# Patient Record
(demographics unavailable — no encounter records)

---

## 2024-12-09 NOTE — HISTORY OF PRESENT ILLNESS
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 He continues to smoke around 1ppd. He has been having laryngitis for about a month. He tried coming off the Trelegy for 3 days to see if that was contributing, but did not change. He feels the Trelegy is helpful. He wants to quit smoking but is having a difficult time. No recent illness or hospitalizations.  [TextBox_11] : 1 [TextBox_13] : 40

## 2024-12-09 NOTE — ASSESSMENT
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   - C/w Trelegy 100 - refills sent - He picked up Nicotrol nasal spray but never started - advised him to start - He is having trouble quitting but knows he needs to and is motivated - Repeat CT ordered for 06/2025 - F/u in 3 mo time routine visit  The patient expressed understanding and agreement with the plan as outlined above and accepts responsibility to be compliant with any recommended testing, treatment, and follow-up visits.  All relevant questions and concerns were addressed.  35 minutes of time were spent on the encounter. Medical records were reviewed, including but not limited to hospital records, outpatient records, laboratory data, and diagnostic imaging studies. Greater than 50% of the face-to-face encounter time was spent on counseling and/or coordination of care.   Abhinav Molina MD, [facility] Pulmonary & Critical Care Medicine [facility] at [place] [address] [place], [place] [address] T: [phone] F: [phone]
Detail Level: Generalized
Detail Level: Detailed

## 2024-12-09 NOTE — COUNSELING
[Cessation strategies including cessation program discussed] : Cessation strategies including cessation program discussed [Encouraged to pick a quit date and identify support needed to quit] : Encouraged to pick a quit date and identify support needed to quit [Yes] : Willing to quit smoking [FreeTextEntry3] : 12

## 2025-05-09 NOTE — HISTORY OF PRESENT ILLNESS
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 He is on Trelegy 100. CT 03/2025 without concerning nodularities. He is smoking around 0.75PPD. No fevers, no chills. Reports sinus issues, post nasal drip, and copious mucus in the morning with coughing.  [TextBox_11] : 1 [TextBox_13] : 40

## 2025-05-09 NOTE — ASSESSMENT
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   - He is on Trelegy 100 - refills sent - Refills of albuterol sent as well - CT 03/2025 without concerning nodularities.  - Will repeat in 6 mo - 09/2025, ordered - Advised to f/u with Dr. Restrepo oncology - F/u with ENT re: his PND - I advised he can try doing nasal sinus rinse such as Neilmed - Ongoing smoking cessation counseling - Advised to start Nicotrol - F/u in 4 mo time  The patient expressed understanding and agreement with the plan as outlined above and accepts responsibility to be compliant with any recommended testing, treatment, and follow-up visits.  All relevant questions and concerns were addressed.  30 minutes of time were spent on the encounter. Medical records were reviewed, including but not limited to hospital records, outpatient records, laboratory data, and diagnostic imaging studies. Greater than 50% of the face-to-face encounter time was spent on counseling and/or coordination of care.   Abhinav Molina MD, [facility] Pulmonary & Critical Care Medicine [facility] at [place] [address] [place], [place] [address] T: [phone] F: [phone]

## 2025-06-20 NOTE — HISTORY OF PRESENT ILLNESS
[FreeTextEntry1] : Mr. MARSH is a 59 year old male referred initially by Dr. Restrepo who presents for a follow up appointment regarding lung nodules.  His past medical history includes current smoker, bladder cancer (high grade urothelial carcinoma, no lymph invasion) s/p excision and subsequent partial cystectomy. He underwent adjuvant chemotherapy x 4 cycles 9/15 - 11/26/21.  He is status post Flexible bronchoscopy, with left robot-assisted lower lobectomy with mediastinal lymph node dissection on 09/12/22 with adenocarcinoma Stage I. He was last seen in our office in 2023 and has been following up with Pulmonology (Dr Molina) since. Most recently he was evaluated with Dr Kaye SHORT with recent PET with concern for metastasis given his two primaries EBUS and sacral biopsy was recommended.   06/18/25: PET Imaging from [facility] ([facility]) - Enlarging FDG avid left prevascular lymph nodes with vocal cord paralysis - Intensely avid subtle lytic lesion in the left hemisacrum consistent with malignancy - New mildly increased FDG uptake in the sigmoid colon non-specific in the background of heavy colonic diverticulosis and chronic appearing wall hypertrophy. - Nondisplaced fractures of the left posterior 5-7 ribs with associated low-grade FDG    03/21/25: CT of the chest non contrast at [place]  - Mediastinal soft tissue changes without discrete nodule in the retrosternal fat which may represent thymic rebound. There is an enlarged prevascular lymph node measuring 1.8 x 1.4 cm.

## 2025-06-20 NOTE — HISTORY OF PRESENT ILLNESS
[FreeTextEntry1] : Mr. MARSH is a 59 year old male referred initially by Dr. Restrepo who presents for a follow up appointment regarding lung nodules.  His past medical history includes current smoker, bladder cancer (high grade urothelial carcinoma, no lymph invasion) s/p excision and subsequent partial cystectomy. He underwent adjuvant chemotherapy x 4 cycles 9/15 - 11/26/21.  He is status post Flexible bronchoscopy, with left robot-assisted lower lobectomy with mediastinal lymph node dissection on 09/12/22 with adenocarcinoma Stage I. He was last seen in our office in 2023 and has been following up with Pulmonology (Dr Molina) since. Most recently he was evaluated with Dr Kaye SHORT with recent PET with concern for metastasis given his two primaries EBUS and sacral biopsy was recommended.   06/18/25: PET Imaging from [facility] ([facility]) - Enlarging FDG avid left prevascular lymph nodes with vocal cord paralysis - Intensely avid subtle lytic lesion in the left hemisacrum consistent with malignancy - New mildly increased FDG uptake in the sigmoid colon non-specific in the background of heavy colonic diverticulosis and chronic appearing wall hypertrophy. - Nondisplaced fractures of the left posterior 5-7 ribs with associated low-grade FDG    03/21/25: CT of the chest non contrast at [place]  - Mediastinal soft tissue changes without discrete nodule in the retrosternal fat which may represent thymic rebound. There is an enlarged prevascular lymph node measuring 1.8 x 1.4 cm.

## 2025-06-20 NOTE — ASSESSMENT
[FreeTextEntry1] : Mr. MARSH is a 59 year old male referred initially by Dr. Restrepo who presents for a follow up appointment regarding lung nodules.  His past medical history includes current smoker, bladder cancer (high grade urothelial carcinoma, no lymph invasion) s/p excision and subsequent partial cystectomy. He underwent adjuvant chemotherapy x 4 cycles 9/15 - 11/26/21.  He is status post Flexible bronchoscopy, with left robot-assisted lower lobectomy with mediastinal lymph node dissection on 09/12/22 with adenocarcinoma Stage I. He was last seen in our office in 2023 and has been following up with Pulmonology (Dr Molina) since. Most recently he was evaluated with Dr Kaye SHORT with recent PET with concern for metastasis given his two primaries EBUS and sacral biopsy was recommended.   06/18/25: PET Imaging from [facility]) - Enlarging FDG avid left prevascular lymph nodes with vocal cord paralysis - Intensely avid subtle lytic lesion in the left hemisacrum consistent with malignancy - New mildly increased FDG uptake in the sigmoid colon non-specific in the background of heavy colonic diverticulosis and chronic appearing wall hypertrophy. - Nondisplaced fractures of the left posterior 5-7 ribs with associated low-grade FDG    03/21/25: CT of the chest non contrast at [place]  - Mediastinal soft tissue changes without discrete nodule in the retrosternal fat which may represent thymic rebound. There is an enlarged prevascular lymph node measuring 1.8 x 1.4 cm.   I have reviewed the patient's medical records and diagnostic images at time of this office consultation and have made the following recommendation: 1.Flex Bronch/EBUS at [facility] 7/2   All questions answered. Patient verbalized understanding and will follow up accordingly.

## 2025-06-20 NOTE — ASSESSMENT
[FreeTextEntry1] : Mr. MARSH is a 59 year old male referred initially by Dr. Restrepo who presents for a follow up appointment regarding lung nodules.  His past medical history includes current smoker, bladder cancer (high grade urothelial carcinoma, no lymph invasion) s/p excision and subsequent partial cystectomy. He underwent adjuvant chemotherapy x 4 cycles 9/15 - 11/26/21.  He is status post Flexible bronchoscopy, with left robot-assisted lower lobectomy with mediastinal lymph node dissection on 09/12/22 with adenocarcinoma Stage I. He was last seen in our office in 2023 and has been following up with Pulmonology (Dr Molina) since. Most recently he was evaluated with Dr Kaye SHORT with recent PET with concern for metastasis given his two primaries EBUS and sacral biopsy was recommended.   06/18/25: PET Imaging from [facility] ([facility]) - Enlarging FDG avid left prevascular lymph nodes with vocal cord paralysis - Intensely avid subtle lytic lesion in the left hemisacrum consistent with malignancy - New mildly increased FDG uptake in the sigmoid colon non-specific in the background of heavy colonic diverticulosis and chronic appearing wall hypertrophy. - Nondisplaced fractures of the left posterior 5-7 ribs with associated low-grade FDG    03/21/25: CT of the chest non contrast at [place]  - Mediastinal soft tissue changes without discrete nodule in the retrosternal fat which may represent thymic rebound. There is an enlarged prevascular lymph node measuring 1.8 x 1.4 cm.   I have reviewed the patient's medical records and diagnostic images at time of this office consultation and have made the following recommendation: 1.Flex Bronch/EBUS at [facility] 7/2   All questions answered. Patient verbalized understanding and will follow up accordingly.

## 2025-06-20 NOTE — DATA REVIEWED
[FreeTextEntry1] : Independent review of imaging and independent interpretation was performed at today's visit. 06/18/25: PET Imaging from [facility] ([facility]) 03/21/25: CT of the chest non contrast at [place]

## 2025-06-20 NOTE — DATA REVIEWED
[FreeTextEntry1] : Independent review of imaging and independent interpretation was performed at today's visit. 06/18/25: PET Imaging from [facility] ([facility]) 03/21/25: CT of the chest non contrast at [place]  Medical Assessment Completed on: 04-Jun-2022 15:37

## 2025-06-20 NOTE — PHYSICAL EXAM
[Neck Appearance] : the appearance of the neck was normal [] : no respiratory distress [Respiration, Rhythm And Depth] : normal respiratory rhythm and effort [Exaggerated Use Of Accessory Muscles For Inspiration] : no accessory muscle use [Auscultation Breath Sounds / Voice Sounds] : lungs were clear to auscultation bilaterally [Examination Of The Chest] : the chest was normal in appearance [Chest Visual Inspection Thoracic Asymmetry] : no chest asymmetry [Bowel Sounds] : normal bowel sounds [Abdomen Soft] : soft [Abdomen Tenderness] : non-tender [Cervical Lymph Nodes Enlarged Posterior Bilaterally] : posterior cervical [Oriented To Time, Place, And Person] : oriented to person, place, and time